# Patient Record
Sex: MALE | Race: WHITE | Employment: FULL TIME | URBAN - METROPOLITAN AREA
[De-identification: names, ages, dates, MRNs, and addresses within clinical notes are randomized per-mention and may not be internally consistent; named-entity substitution may affect disease eponyms.]

---

## 2019-08-23 ENCOUNTER — HOSPITAL ENCOUNTER (OUTPATIENT)
Age: 59
Setting detail: OBSERVATION
Discharge: HOME | End: 2019-08-24
Attending: EMERGENCY MEDICINE | Admitting: INTERNAL MEDICINE
Payer: COMMERCIAL

## 2019-08-23 ENCOUNTER — APPOINTMENT (OUTPATIENT)
Dept: GENERAL RADIOLOGY | Age: 59
End: 2019-08-23
Payer: COMMERCIAL

## 2019-08-23 DIAGNOSIS — R07.9 CHEST PAIN, UNSPECIFIED TYPE: Primary | ICD-10-CM

## 2019-08-23 LAB
ANION GAP SERPL CALCULATED.3IONS-SCNC: 7 MMOL/L (ref 7–16)
BASOPHILS ABSOLUTE: 0.04 E9/L (ref 0–0.2)
BASOPHILS RELATIVE PERCENT: 0.4 % (ref 0–2)
BUN BLDV-MCNC: 17 MG/DL (ref 8–23)
CALCIUM SERPL-MCNC: 9.7 MG/DL (ref 8.6–10.2)
CHLORIDE BLD-SCNC: 107 MMOL/L (ref 98–107)
CK MB: 2.6 NG/ML (ref 0–7.7)
CO2: 27 MMOL/L (ref 22–29)
CREAT SERPL-MCNC: 1 MG/DL (ref 0.7–1.2)
EKG ATRIAL RATE: 110 BPM
EKG ATRIAL RATE: 66 BPM
EKG P AXIS: 52 DEGREES
EKG P AXIS: 54 DEGREES
EKG P-R INTERVAL: 136 MS
EKG Q-T INTERVAL: 386 MS
EKG Q-T INTERVAL: 406 MS
EKG QRS DURATION: 90 MS
EKG QRS DURATION: 92 MS
EKG QTC CALCULATION (BAZETT): 398 MS
EKG QTC CALCULATION (BAZETT): 404 MS
EKG R AXIS: 46 DEGREES
EKG R AXIS: 52 DEGREES
EKG T AXIS: 55 DEGREES
EKG T AXIS: 56 DEGREES
EKG VENTRICULAR RATE: 58 BPM
EKG VENTRICULAR RATE: 66 BPM
EOSINOPHILS ABSOLUTE: 0.37 E9/L (ref 0.05–0.5)
EOSINOPHILS RELATIVE PERCENT: 3.9 % (ref 0–6)
GFR AFRICAN AMERICAN: >60
GFR NON-AFRICAN AMERICAN: >60 ML/MIN/1.73
GLUCOSE BLD-MCNC: 106 MG/DL (ref 74–99)
HCT VFR BLD CALC: 48.7 % (ref 37–54)
HEMOGLOBIN: 16.4 G/DL (ref 12.5–16.5)
IMMATURE GRANULOCYTES #: 0.07 E9/L
IMMATURE GRANULOCYTES %: 0.7 % (ref 0–5)
LYMPHOCYTES ABSOLUTE: 2.59 E9/L (ref 1.5–4)
LYMPHOCYTES RELATIVE PERCENT: 27.2 % (ref 20–42)
MCH RBC QN AUTO: 30.9 PG (ref 26–35)
MCHC RBC AUTO-ENTMCNC: 33.7 % (ref 32–34.5)
MCV RBC AUTO: 91.7 FL (ref 80–99.9)
MONOCYTES ABSOLUTE: 0.74 E9/L (ref 0.1–0.95)
MONOCYTES RELATIVE PERCENT: 7.8 % (ref 2–12)
NEUTROPHILS ABSOLUTE: 5.72 E9/L (ref 1.8–7.3)
NEUTROPHILS RELATIVE PERCENT: 60 % (ref 43–80)
PDW BLD-RTO: 14.3 FL (ref 11.5–15)
PLATELET # BLD: 237 E9/L (ref 130–450)
PMV BLD AUTO: 11.8 FL (ref 7–12)
POTASSIUM SERPL-SCNC: 4.5 MMOL/L (ref 3.5–5)
RBC # BLD: 5.31 E12/L (ref 3.8–5.8)
REASON FOR REJECTION: NORMAL
REJECTED TEST: NORMAL
SODIUM BLD-SCNC: 141 MMOL/L (ref 132–146)
TOTAL CK: 99 U/L (ref 20–200)
TROPONIN: <0.01 NG/ML (ref 0–0.03)
TROPONIN: <0.01 NG/ML (ref 0–0.03)
WBC # BLD: 9.5 E9/L (ref 4.5–11.5)

## 2019-08-23 PROCEDURE — G0378 HOSPITAL OBSERVATION PER HR: HCPCS

## 2019-08-23 PROCEDURE — 2580000003 HC RX 258: Performed by: INTERNAL MEDICINE

## 2019-08-23 PROCEDURE — 6370000000 HC RX 637 (ALT 250 FOR IP): Performed by: PREVENTIVE MEDICINE

## 2019-08-23 PROCEDURE — 96374 THER/PROPH/DIAG INJ IV PUSH: CPT

## 2019-08-23 PROCEDURE — 85025 COMPLETE CBC W/AUTO DIFF WBC: CPT

## 2019-08-23 PROCEDURE — 82550 ASSAY OF CK (CPK): CPT

## 2019-08-23 PROCEDURE — 6370000000 HC RX 637 (ALT 250 FOR IP): Performed by: PHYSICIAN ASSISTANT

## 2019-08-23 PROCEDURE — 93010 ELECTROCARDIOGRAM REPORT: CPT | Performed by: INTERNAL MEDICINE

## 2019-08-23 PROCEDURE — 82553 CREATINE MB FRACTION: CPT

## 2019-08-23 PROCEDURE — 6370000000 HC RX 637 (ALT 250 FOR IP): Performed by: INTERNAL MEDICINE

## 2019-08-23 PROCEDURE — 99285 EMERGENCY DEPT VISIT HI MDM: CPT

## 2019-08-23 PROCEDURE — 93005 ELECTROCARDIOGRAM TRACING: CPT | Performed by: PREVENTIVE MEDICINE

## 2019-08-23 PROCEDURE — 80048 BASIC METABOLIC PNL TOTAL CA: CPT

## 2019-08-23 PROCEDURE — 6370000000 HC RX 637 (ALT 250 FOR IP): Performed by: EMERGENCY MEDICINE

## 2019-08-23 PROCEDURE — 84484 ASSAY OF TROPONIN QUANT: CPT

## 2019-08-23 PROCEDURE — 71045 X-RAY EXAM CHEST 1 VIEW: CPT

## 2019-08-23 PROCEDURE — 36415 COLL VENOUS BLD VENIPUNCTURE: CPT

## 2019-08-23 PROCEDURE — 99244 OFF/OP CNSLTJ NEW/EST MOD 40: CPT | Performed by: INTERNAL MEDICINE

## 2019-08-23 PROCEDURE — 94761 N-INVAS EAR/PLS OXIMETRY MLT: CPT

## 2019-08-23 PROCEDURE — 6360000002 HC RX W HCPCS: Performed by: EMERGENCY MEDICINE

## 2019-08-23 RX ORDER — ASPIRIN 81 MG/1
324 TABLET, CHEWABLE ORAL ONCE
Status: COMPLETED | OUTPATIENT
Start: 2019-08-23 | End: 2019-08-23

## 2019-08-23 RX ORDER — ASCORBIC ACID 500 MG
500 TABLET ORAL DAILY
COMMUNITY

## 2019-08-23 RX ORDER — PANTOPRAZOLE SODIUM 40 MG/1
40 TABLET, DELAYED RELEASE ORAL DAILY
Status: DISCONTINUED | OUTPATIENT
Start: 2019-08-23 | End: 2019-08-24 | Stop reason: HOSPADM

## 2019-08-23 RX ORDER — UBIDECARENONE 50 MG
50 CAPSULE ORAL DAILY
COMMUNITY

## 2019-08-23 RX ORDER — PANTOPRAZOLE SODIUM 40 MG/1
40 TABLET, DELAYED RELEASE ORAL DAILY
Status: ON HOLD | COMMUNITY
End: 2019-08-24 | Stop reason: HOSPADM

## 2019-08-23 RX ORDER — POTASSIUM CHLORIDE 7.45 MG/ML
10 INJECTION INTRAVENOUS PRN
Status: DISCONTINUED | OUTPATIENT
Start: 2019-08-23 | End: 2019-08-24 | Stop reason: HOSPADM

## 2019-08-23 RX ORDER — ONDANSETRON 2 MG/ML
4 INJECTION INTRAMUSCULAR; INTRAVENOUS EVERY 6 HOURS PRN
Status: DISCONTINUED | OUTPATIENT
Start: 2019-08-23 | End: 2019-08-24 | Stop reason: HOSPADM

## 2019-08-23 RX ORDER — KETOROLAC TROMETHAMINE 30 MG/ML
30 INJECTION, SOLUTION INTRAMUSCULAR; INTRAVENOUS ONCE
Status: COMPLETED | OUTPATIENT
Start: 2019-08-23 | End: 2019-08-23

## 2019-08-23 RX ORDER — KETOROLAC TROMETHAMINE 30 MG/ML
15 INJECTION, SOLUTION INTRAMUSCULAR; INTRAVENOUS EVERY 6 HOURS PRN
Status: DISCONTINUED | OUTPATIENT
Start: 2019-08-23 | End: 2019-08-24 | Stop reason: HOSPADM

## 2019-08-23 RX ORDER — SODIUM CHLORIDE 0.9 % (FLUSH) 0.9 %
10 SYRINGE (ML) INJECTION PRN
Status: DISCONTINUED | OUTPATIENT
Start: 2019-08-23 | End: 2019-08-23 | Stop reason: DRUGHIGH

## 2019-08-23 RX ORDER — ASPIRIN 81 MG/1
81 TABLET, CHEWABLE ORAL DAILY
Status: DISCONTINUED | OUTPATIENT
Start: 2019-08-23 | End: 2019-08-23 | Stop reason: DRUGHIGH

## 2019-08-23 RX ORDER — M-VIT,TX,IRON,MINS/CALC/FOLIC 27MG-0.4MG
1 TABLET ORAL DAILY
Status: DISCONTINUED | OUTPATIENT
Start: 2019-08-23 | End: 2019-08-24 | Stop reason: HOSPADM

## 2019-08-23 RX ORDER — SODIUM CHLORIDE 0.9 % (FLUSH) 0.9 %
10 SYRINGE (ML) INJECTION PRN
Status: DISCONTINUED | OUTPATIENT
Start: 2019-08-23 | End: 2019-08-24 | Stop reason: HOSPADM

## 2019-08-23 RX ORDER — ROSUVASTATIN CALCIUM 20 MG/1
20 TABLET, COATED ORAL DAILY
Status: DISCONTINUED | OUTPATIENT
Start: 2019-08-23 | End: 2019-08-24 | Stop reason: HOSPADM

## 2019-08-23 RX ORDER — SODIUM CHLORIDE 0.9 % (FLUSH) 0.9 %
10 SYRINGE (ML) INJECTION EVERY 12 HOURS SCHEDULED
Status: DISCONTINUED | OUTPATIENT
Start: 2019-08-23 | End: 2019-08-24 | Stop reason: HOSPADM

## 2019-08-23 RX ORDER — POTASSIUM CHLORIDE 20 MEQ/1
40 TABLET, EXTENDED RELEASE ORAL PRN
Status: DISCONTINUED | OUTPATIENT
Start: 2019-08-23 | End: 2019-08-24 | Stop reason: HOSPADM

## 2019-08-23 RX ORDER — NITROGLYCERIN 0.4 MG/1
0.4 TABLET SUBLINGUAL EVERY 5 MIN PRN
Status: COMPLETED | OUTPATIENT
Start: 2019-08-23 | End: 2019-08-23

## 2019-08-23 RX ORDER — ACETAMINOPHEN 325 MG/1
650 TABLET ORAL EVERY 4 HOURS PRN
Status: DISCONTINUED | OUTPATIENT
Start: 2019-08-23 | End: 2019-08-24 | Stop reason: HOSPADM

## 2019-08-23 RX ORDER — HYDROCODONE BITARTRATE AND ACETAMINOPHEN 5; 325 MG/1; MG/1
1 TABLET ORAL EVERY 6 HOURS PRN
Status: COMPLETED | OUTPATIENT
Start: 2019-08-23 | End: 2019-08-24

## 2019-08-23 RX ORDER — ASPIRIN 81 MG/1
81 TABLET ORAL DAILY
Status: DISCONTINUED | OUTPATIENT
Start: 2019-08-23 | End: 2019-08-24 | Stop reason: HOSPADM

## 2019-08-23 RX ADMIN — HYDROCODONE BITARTRATE AND ACETAMINOPHEN 1 TABLET: 5; 325 TABLET ORAL at 21:14

## 2019-08-23 RX ADMIN — ASPIRIN 81 MG 324 MG: 81 TABLET ORAL at 09:24

## 2019-08-23 RX ADMIN — ACETAMINOPHEN 650 MG: 325 TABLET, FILM COATED ORAL at 15:57

## 2019-08-23 RX ADMIN — LIDOCAINE HYDROCHLORIDE: 20 SOLUTION ORAL; TOPICAL at 15:57

## 2019-08-23 RX ADMIN — KETOROLAC TROMETHAMINE 30 MG: 30 INJECTION, SOLUTION INTRAMUSCULAR; INTRAVENOUS at 12:16

## 2019-08-23 RX ADMIN — NITROGLYCERIN 0.4 MG: 0.4 TABLET, ORALLY DISINTEGRATING SUBLINGUAL at 09:52

## 2019-08-23 RX ADMIN — Medication 10 ML: at 20:19

## 2019-08-23 RX ADMIN — NITROGLYCERIN 0.4 MG: 0.4 TABLET, ORALLY DISINTEGRATING SUBLINGUAL at 09:33

## 2019-08-23 RX ADMIN — NITROGLYCERIN 0.4 MG: 0.4 TABLET, ORALLY DISINTEGRATING SUBLINGUAL at 09:41

## 2019-08-23 ASSESSMENT — PAIN DESCRIPTION - DESCRIPTORS
DESCRIPTORS: SHARP;STABBING
DESCRIPTORS: SHARP
DESCRIPTORS: CONSTANT;SHARP
DESCRIPTORS: SHARP;STABBING

## 2019-08-23 ASSESSMENT — PAIN SCALES - GENERAL
PAINLEVEL_OUTOF10: 8
PAINLEVEL_OUTOF10: 8
PAINLEVEL_OUTOF10: 3
PAINLEVEL_OUTOF10: 8
PAINLEVEL_OUTOF10: 4
PAINLEVEL_OUTOF10: 8
PAINLEVEL_OUTOF10: 9
PAINLEVEL_OUTOF10: 0

## 2019-08-23 ASSESSMENT — PAIN DESCRIPTION - ONSET
ONSET: ON-GOING
ONSET: ON-GOING

## 2019-08-23 ASSESSMENT — PAIN DESCRIPTION - PROGRESSION
CLINICAL_PROGRESSION: GRADUALLY WORSENING
CLINICAL_PROGRESSION: NOT CHANGED

## 2019-08-23 ASSESSMENT — PAIN DESCRIPTION - LOCATION
LOCATION: CHEST

## 2019-08-23 ASSESSMENT — PAIN DESCRIPTION - FREQUENCY
FREQUENCY: CONTINUOUS

## 2019-08-23 ASSESSMENT — PAIN DESCRIPTION - ORIENTATION
ORIENTATION: LEFT

## 2019-08-23 ASSESSMENT — PAIN DESCRIPTION - PAIN TYPE
TYPE: ACUTE PAIN
TYPE: ACUTE PAIN

## 2019-08-23 NOTE — ED PROVIDER NOTES
--------------------------------- IMPRESSION AND DISPOSITION ---------------------------------    IMPRESSION  1. Chest pain, unspecified type        DISPOSITION  Disposition: Admit to telemetry  Patient condition is stable    SCRIBE ATTESTATION  8/23/19, 9:53 AM.    This note is prepared by Khadra Jay acting as Scribe for Garey Peabody, DO and Belen Martinez DO. Garey Peabody, DO:  The scribe's documentation has been prepared under my direction and personally reviewed by me in its entirety. I confirm that the note above accurately reflects all work, treatment, procedures, and medical decision making performed by me.              Rigo Cleveland DO  Resident  08/23/19 9776

## 2019-08-23 NOTE — CONSULTS
Consult dictated  NON cardiac symptoms  No tests planned  Given his history of GERD maybe he has esophagitis  Will sign off
venous distention. No carotid bruits. RESPIRATORY:  The lungs are clear. There are no rales or wheeze. CARDIOVASCULAR:  The apical impulse is nondisplaced. Normal S1, single  S2. No obvious murmur, gallop, rub, or click detected. ABDOMEN:  Soft and nontender to palpation. Bowel sounds present. No  palpable masses. MUSCULOSKELETAL:  Good muscle strength and tone. No atrophy or abnormal  movement. SKIN:  Warm and dry. No stasis dermatitis or ulcers. NEUROLOGIC/PSYCHIATRIC:  Oriented to person, place, and time. Speech  clear and appropriate. LABORATORY DATA:  GFR greater than 60. Troponin negative. Hemoglobin  16.4. Chest x-ray unremarkable. EKG, in normal sinus rhythm. IMPRESSION/PLAN:  The patient presents with chest discomfort that is not  cardiac. Merely by clinical history, one can determine that this is not  ischemic pain. Also, this is not pericarditis as there is no rub,  characteristic pain, EKG changes. This is also not a pulmonary  embolism. Perhaps, he has gastroesophageal disease and severe  gastritis. I will recommend GI evaluation. No cardiac tests are  planned, we will sign off.         Mary Beckett MD    D: 08/23/2019 14:31:33       T: 08/23/2019 15:34:51     [email or web address]  Job#: [id number]     Doc#: [id number]    CC:

## 2019-08-24 VITALS
HEART RATE: 68 BPM | HEIGHT: 76 IN | OXYGEN SATURATION: 96 % | TEMPERATURE: 98.2 F | SYSTOLIC BLOOD PRESSURE: 139 MMHG | RESPIRATION RATE: 18 BRPM | DIASTOLIC BLOOD PRESSURE: 102 MMHG | WEIGHT: 186 LBS | BODY MASS INDEX: 22.65 KG/M2

## 2019-08-24 LAB
ANION GAP SERPL CALCULATED.3IONS-SCNC: 11 MMOL/L (ref 7–16)
BASOPHILS ABSOLUTE: 0.04 E9/L (ref 0–0.2)
BASOPHILS RELATIVE PERCENT: 0.5 % (ref 0–2)
BUN BLDV-MCNC: 17 MG/DL (ref 8–23)
CALCIUM SERPL-MCNC: 9.2 MG/DL (ref 8.6–10.2)
CHLORIDE BLD-SCNC: 103 MMOL/L (ref 98–107)
CO2: 30 MMOL/L (ref 22–29)
CREAT SERPL-MCNC: 1.3 MG/DL (ref 0.7–1.2)
EOSINOPHILS ABSOLUTE: 0.39 E9/L (ref 0.05–0.5)
EOSINOPHILS RELATIVE PERCENT: 4.5 % (ref 0–6)
GFR AFRICAN AMERICAN: >60
GFR NON-AFRICAN AMERICAN: 56 ML/MIN/1.73
GLUCOSE BLD-MCNC: 101 MG/DL (ref 74–99)
HCT VFR BLD CALC: 46.9 % (ref 37–54)
HEMOGLOBIN: 15.6 G/DL (ref 12.5–16.5)
IMMATURE GRANULOCYTES #: 0.06 E9/L
IMMATURE GRANULOCYTES %: 0.7 % (ref 0–5)
LYMPHOCYTES ABSOLUTE: 3.27 E9/L (ref 1.5–4)
LYMPHOCYTES RELATIVE PERCENT: 37.8 % (ref 20–42)
MCH RBC QN AUTO: 30.8 PG (ref 26–35)
MCHC RBC AUTO-ENTMCNC: 33.3 % (ref 32–34.5)
MCV RBC AUTO: 92.7 FL (ref 80–99.9)
MONOCYTES ABSOLUTE: 0.72 E9/L (ref 0.1–0.95)
MONOCYTES RELATIVE PERCENT: 8.3 % (ref 2–12)
NEUTROPHILS ABSOLUTE: 4.18 E9/L (ref 1.8–7.3)
NEUTROPHILS RELATIVE PERCENT: 48.2 % (ref 43–80)
PDW BLD-RTO: 14.3 FL (ref 11.5–15)
PLATELET # BLD: 167 E9/L (ref 130–450)
PMV BLD AUTO: 11.8 FL (ref 7–12)
POTASSIUM REFLEX MAGNESIUM: 4.3 MMOL/L (ref 3.5–5)
RBC # BLD: 5.06 E12/L (ref 3.8–5.8)
SODIUM BLD-SCNC: 144 MMOL/L (ref 132–146)
WBC # BLD: 8.7 E9/L (ref 4.5–11.5)

## 2019-08-24 PROCEDURE — 6370000000 HC RX 637 (ALT 250 FOR IP): Performed by: INTERNAL MEDICINE

## 2019-08-24 PROCEDURE — 2580000003 HC RX 258: Performed by: INTERNAL MEDICINE

## 2019-08-24 PROCEDURE — 6370000000 HC RX 637 (ALT 250 FOR IP): Performed by: PHYSICIAN ASSISTANT

## 2019-08-24 PROCEDURE — 36415 COLL VENOUS BLD VENIPUNCTURE: CPT

## 2019-08-24 PROCEDURE — G0378 HOSPITAL OBSERVATION PER HR: HCPCS

## 2019-08-24 PROCEDURE — 80048 BASIC METABOLIC PNL TOTAL CA: CPT

## 2019-08-24 PROCEDURE — 85025 COMPLETE CBC W/AUTO DIFF WBC: CPT

## 2019-08-24 RX ADMIN — Medication 10 ML: at 09:15

## 2019-08-24 RX ADMIN — HYDROCODONE BITARTRATE AND ACETAMINOPHEN 1 TABLET: 5; 325 TABLET ORAL at 06:11

## 2019-08-24 RX ADMIN — MULTIPLE VITAMINS W/ MINERALS TAB 1 TABLET: TAB at 09:14

## 2019-08-24 RX ADMIN — PANTOPRAZOLE SODIUM 40 MG: 40 TABLET, DELAYED RELEASE ORAL at 09:15

## 2019-08-24 RX ADMIN — ASPIRIN 81 MG: 81 TABLET, COATED ORAL at 09:13

## 2019-08-24 RX ADMIN — ROSUVASTATIN CALCIUM 20 MG: 20 TABLET, FILM COATED ORAL at 09:14

## 2019-08-24 ASSESSMENT — PAIN DESCRIPTION - DESCRIPTORS: DESCRIPTORS: ACHING;SHARP;STABBING

## 2019-08-24 ASSESSMENT — PAIN DESCRIPTION - ONSET: ONSET: ON-GOING

## 2019-08-24 ASSESSMENT — PAIN DESCRIPTION - ORIENTATION: ORIENTATION: LEFT

## 2019-08-24 ASSESSMENT — PAIN SCALES - GENERAL
PAINLEVEL_OUTOF10: 8
PAINLEVEL_OUTOF10: 8

## 2019-08-24 ASSESSMENT — PAIN DESCRIPTION - PAIN TYPE: TYPE: ACUTE PAIN

## 2019-08-24 ASSESSMENT — PAIN DESCRIPTION - PROGRESSION
CLINICAL_PROGRESSION: GRADUALLY WORSENING
CLINICAL_PROGRESSION: GRADUALLY WORSENING

## 2019-08-24 ASSESSMENT — PAIN DESCRIPTION - FREQUENCY: FREQUENCY: CONTINUOUS

## 2019-08-24 ASSESSMENT — PAIN DESCRIPTION - LOCATION: LOCATION: CHEST

## 2019-08-24 NOTE — H&P
reports that he has been smoking cigarettes. He has been smoking about 1.00 pack per day. He has never used smokeless tobacco.  ETOH:   reports that he drinks about 6.0 standard drinks of alcohol per week. MARITAL STATUS:    OCCUPATION:      Family History:   History reviewed. No pertinent family history. REVIEW OF SYSTEMS:    General ROS:left cp   Hematological and Lymphatic ROS: negative  Endocrine ROS: negative  Respiratory ROS: no cough, shortness of breath, or wheezing  Cardiovascular ROS: no chest pain or dyspnea on exertion  Gastrointestinal ROS: no abdominal pain, change in bowel habits, or black or bloody stools  Genito-Urinary ROS: no dysuria, trouble voiding, or hematuria  Neurological ROS: no TIA or stroke symptoms  negative    Vitals:  BP (!) 139/102   Pulse 68   Temp 98.2 °F (36.8 °C) (Oral)   Resp 18   Ht 6' 4\" (1.93 m)   Wt 186 lb (84.4 kg)   SpO2 96%   BMI 22.64 kg/m²     PHYSICAL EXAM:  General:  Awake, alert, oriented X 3. Well developed, well nourished, well groomed. No apparent distress. HEENT:  Normocephalic, atraumatic. Pupils equal, round, reactive to light. No scleral icterus. No conjunctival injection. Normal lips, teeth, and gums. No nasal discharge. Neck:  Supple  Heart:  RRR, no murmurs, gallops, rubs, carotid upstroke normal, no carotid bruits  Lungs:  CTA bilaterally, bilat symmetrical expansion, no wheeze, rales, or rhonchi  Abdomen:   Bowel sounds present, soft, nontender, no masses, no organomegaly, no peritoneal signs  Extremities:  No clubbing, cyanosis, or edema  Skin:  Warm and dry, no open lesions or rash  Neuro:  Cranial nerves 2-12 intact, no focal deficits  Breast: deferred  Rectal: deferred  Genitalia:  deferred      DATA:     Recent Labs     08/23/19  0905 08/24/19  0559   WBC 9.5 8.7   HGB 16.4 15.6    167     Recent Labs     08/23/19  1002 08/24/19  0559    144   K 4.5 4.3   BUN 17 17   CREATININE 1.0 1.3*     No results for input(s):

## 2019-08-26 ENCOUNTER — TELEPHONE (OUTPATIENT)
Dept: CARDIOLOGY CLINIC | Age: 59
End: 2019-08-26

## 2021-05-12 ENCOUNTER — TELEPHONE (OUTPATIENT)
Dept: CARDIOLOGY | Age: 61
End: 2021-05-12

## 2021-05-12 ENCOUNTER — OFFICE VISIT (OUTPATIENT)
Dept: CARDIOLOGY CLINIC | Age: 61
End: 2021-05-12
Payer: COMMERCIAL

## 2021-05-12 VITALS
HEART RATE: 57 BPM | BODY MASS INDEX: 25.81 KG/M2 | SYSTOLIC BLOOD PRESSURE: 118 MMHG | DIASTOLIC BLOOD PRESSURE: 80 MMHG | WEIGHT: 190.6 LBS | OXYGEN SATURATION: 94 % | RESPIRATION RATE: 14 BRPM | HEIGHT: 72 IN

## 2021-05-12 DIAGNOSIS — R00.1 BRADYCARDIA: ICD-10-CM

## 2021-05-12 DIAGNOSIS — R07.2 PRECORDIAL PAIN: Primary | ICD-10-CM

## 2021-05-12 DIAGNOSIS — R06.09 DYSPNEA ON EXERTION: ICD-10-CM

## 2021-05-12 DIAGNOSIS — R42 DIZZINESS: ICD-10-CM

## 2021-05-12 PROCEDURE — 99244 OFF/OP CNSLTJ NEW/EST MOD 40: CPT | Performed by: INTERNAL MEDICINE

## 2021-05-12 PROCEDURE — 93000 ELECTROCARDIOGRAM COMPLETE: CPT | Performed by: INTERNAL MEDICINE

## 2021-05-12 NOTE — PROGRESS NOTES
Patient Active Problem List   Diagnosis    Chest pain       Current Outpatient Medications   Medication Sig Dispense Refill    Coenzyme Q10 (COQ10) 50 MG CAPS Take 50 mg by mouth daily      vitamin C (ASCORBIC ACID) 500 MG tablet Take 500 mg by mouth daily      fish oil-omega-3 fatty acids 1000 MG capsule Take 2 g by mouth daily.  rosuvastatin (CRESTOR) 10 MG tablet Take 20 mg by mouth daily.  aspirin 81 MG EC tablet Take 81 mg by mouth daily.  therapeutic multivitamin-minerals (THERAGRAN-M) tablet Take 1 tablet by mouth daily. No current facility-administered medications for this visit. CC:    Patient is seen in Initial Evaluation for:  1. Bradycardia    2. Dizziness    3. Precordial pain    4. Dyspnea on exertion        HPI:  Patient is seen in evaluation for 3 new issues:  Relates episode of chest discomfort after working out. This was without radiation. He had no associated diaphoresis nausea or vomiting. Additionally, he relates he has had shortness of breath with exertion since that time. Also, he has noted some light headedness. Lately has noted to be mildly bradycardia.       ROS:   General: No unusual weight gain, no change in exercise tolerance  Skin: No rash or itching  EENT: No vision changes or nosebleeds  Cardiovascular: No orthopnea or paroxysmal nocturnal dyspnea  Respiratory: No cough or hemoptysis  Gastrointestinal: No hematemesis or recent changes in bowel habits  Genitourinary: No hematuria, urgency or frequency  Musculoskeletal: No muscular weakness or joint swelling   Neurologic / Psychiatric: No incoordination or convulsions  Allergic / Immunologic/ Lymphatic / Endocrine: No anemia or bleeding tendency    Social History     Socioeconomic History    Marital status:      Spouse name: Not on file    Number of children: Not on file    Years of education: Not on file    Highest education level: Not on file   Occupational History    Not on file Social Needs    Financial resource strain: Not on file    Food insecurity     Worry: Not on file     Inability: Not on file    Transportation needs     Medical: Not on file     Non-medical: Not on file   Tobacco Use    Smoking status: Current Every Day Smoker     Packs/day: 1.00     Types: Cigarettes    Smokeless tobacco: Never Used   Substance and Sexual Activity    Alcohol use: Yes     Alcohol/week: 6.0 standard drinks     Types: 6 Cans of beer per week    Drug use: No    Sexual activity: Not on file   Lifestyle    Physical activity     Days per week: Not on file     Minutes per session: Not on file    Stress: Not on file   Relationships    Social connections     Talks on phone: Not on file     Gets together: Not on file     Attends Congregation service: Not on file     Active member of club or organization: Not on file     Attends meetings of clubs or organizations: Not on file     Relationship status: Not on file    Intimate partner violence     Fear of current or ex partner: Not on file     Emotionally abused: Not on file     Physically abused: Not on file     Forced sexual activity: Not on file   Other Topics Concern    Not on file   Social History Narrative    Not on file       Family History   Problem Relation Age of Onset    Heart Attack Mother     Heart Attack Father    Father with heart attack at age 68. Mother age 46    Past Medical History:   Diagnosis Date    GERD (gastroesophageal reflux disease)     Hyperlipidemia        PHYSICAL EXAM:  CONSTITUTIONAL:  Well developed, well nourished    Vitals:    05/12/21 0715 05/12/21 0727   BP: (!) 144/88 118/80   Site: Right Upper Arm Left Upper Arm   Pulse: 57    Resp: 14    SpO2: 94%    Weight: 190 lb 9.6 oz (86.5 kg)    Height: 6' (1.829 m)      HEAD & FACE: Normocephalic. Symmetric. EYES: No xanthelasma. Conjunctivae not injected. EARS, NOSE, MOUTH & THROAT: Good dentition. No oral pallor or cyanosis. NECK: No JVD at 30 degrees.   No thyromegaly. RESPIRATORY: Clear to auscultation and percussion in all fields. No use of accessory muscle or intercostal retractions. CARDIOVASCULAR: Regular rate and rhythm. No lifts or thrills on palpitation. Auscultation with normal S1-S2 in intensity and splitting. No carotid bruits. Abdominal aorta not enlarged. Femoral arteries without bruits. Pedal pulses 2+. No edema. ABDOMEN: Soft without hepatic or splenic enlargement. No tenderness. MUSCULOSKELETAL: No kyphosis or scoliosis of the back. Good muscle strength and tone. No muscle atrophy. Normal gait and ability to undergo exercise stress testing. EXTREMITIES: No clubbing or cyanosis. SKIN: No Xanthomas or ulcerations. NEUROLOGIC: Oriented to time, place and person. Normal mood and affect. LYMPHATIC:  No palpable neck or supraclavicular nodes. No splenomegaly. EKG: the EKG tracing was reviewed and found to reveal: Normal sinus rhythm. Diffuse ST-T wave changes. ASSESSMENT:                                                     ORDERS:       Diagnosis Orders   1. Precordial pain  NM Cardiac Stress Test Nuclear Imaging    Cardiac Stress Test - w/Pharm   2. Bradycardia  EKG 12 lead   3. Dizziness     4. Dyspnea on exertion     Will require further evaluation with stress testing. Nuclear imaging required due to resting EKG changes. PLAN:   See above orders. Medication reconciliation completed. Old records were reviewed and found to reveal:   Discussed issues that would prompt earlier evaluation. Same cardiac medications. Follow-up office visit - pending above evaluation.

## 2021-05-13 ENCOUNTER — HOSPITAL ENCOUNTER (OUTPATIENT)
Dept: CARDIOLOGY | Age: 61
Discharge: HOME | End: 2021-05-13
Payer: COMMERCIAL

## 2021-05-13 VITALS
WEIGHT: 190 LBS | HEIGHT: 72 IN | SYSTOLIC BLOOD PRESSURE: 122 MMHG | BODY MASS INDEX: 25.73 KG/M2 | DIASTOLIC BLOOD PRESSURE: 68 MMHG

## 2021-05-13 DIAGNOSIS — R07.9 CHEST PAIN, UNSPECIFIED TYPE: Primary | ICD-10-CM

## 2021-05-13 PROCEDURE — 3430000000 HC RX DIAGNOSTIC RADIOPHARMACEUTICAL: Performed by: INTERNAL MEDICINE

## 2021-05-13 PROCEDURE — 2580000003 HC RX 258: Performed by: INTERNAL MEDICINE

## 2021-05-13 PROCEDURE — 6360000002 HC RX W HCPCS: Performed by: INTERNAL MEDICINE

## 2021-05-13 PROCEDURE — 78452 HT MUSCLE IMAGE SPECT MULT: CPT

## 2021-05-13 PROCEDURE — 93017 CV STRESS TEST TRACING ONLY: CPT

## 2021-05-13 PROCEDURE — A9500 TC99M SESTAMIBI: HCPCS | Performed by: INTERNAL MEDICINE

## 2021-05-13 RX ORDER — PANTOPRAZOLE SODIUM 40 MG/1
40 GRANULE, DELAYED RELEASE ORAL
COMMUNITY

## 2021-05-13 RX ORDER — SODIUM CHLORIDE 9 MG/ML
25 INJECTION, SOLUTION INTRAVENOUS PRN
Status: DISCONTINUED | OUTPATIENT
Start: 2021-05-13 | End: 2021-05-14 | Stop reason: HOSPADM

## 2021-05-13 RX ORDER — SODIUM CHLORIDE 0.9 % (FLUSH) 0.9 %
10 SYRINGE (ML) INJECTION PRN
Status: DISCONTINUED | OUTPATIENT
Start: 2021-05-13 | End: 2021-05-14 | Stop reason: HOSPADM

## 2021-05-13 RX ADMIN — SODIUM CHLORIDE, PRESERVATIVE FREE 10 ML: 5 INJECTION INTRAVENOUS at 09:49

## 2021-05-13 RX ADMIN — SODIUM CHLORIDE, PRESERVATIVE FREE 10 ML: 5 INJECTION INTRAVENOUS at 07:45

## 2021-05-13 RX ADMIN — Medication 30 MILLICURIE: at 09:48

## 2021-05-13 RX ADMIN — REGADENOSON 0.4 MG: 0.08 INJECTION, SOLUTION INTRAVENOUS at 09:48

## 2021-05-13 RX ADMIN — Medication 9.9 MILLICURIE: at 07:45

## 2021-05-13 RX ADMIN — SODIUM CHLORIDE, PRESERVATIVE FREE 10 ML: 5 INJECTION INTRAVENOUS at 09:48

## 2021-05-13 SDOH — HEALTH STABILITY: MENTAL HEALTH: HOW MANY STANDARD DRINKS CONTAINING ALCOHOL DO YOU HAVE ON A TYPICAL DAY?: NOT ASKED

## 2021-05-13 SDOH — HEALTH STABILITY: MENTAL HEALTH: HOW OFTEN DO YOU HAVE A DRINK CONTAINING ALCOHOL?: NOT ASKED

## 2021-05-13 NOTE — PROCEDURES
[address] [facility] [address]  [phone]                Pharmacologic Stress Nuclear Gated SPECT Study    Name: Caleb Spaulding Account Number: [de-identified]    :  1960          Sex: male         Date of Study:  2021    Height: 6' (182.9 cm)         Weight: 190 lb (86.2 kg)     Ordering Provider: Essence Sharma. Iris Brian MD          PCP: Todd Bryant DO      Cardiologist: Essence Sharma. Iris Brian MD             Interpreting Physician: Essence Sharma. Abeba Lucero  _________________________________________________________________________________    Indication:   Detecting the presence and location of coronary artery disease    Clinical History:   Patient has no known history of coronary artery disease. Resting ECG:    AR int .18 sec, QRS int . 10 sec, QT int . 40 sec; HR 57 bpm  Normal sinus rhythm and Nonspecific ST-T wave changes    Procedure:   Pharmacologic stress testing was performed with regadenoson 0.4 mg for 15 seconds. The heart rate was 57 at baseline and judi to 103 beats during the infusion. This corresponds to 65% of maximum predicted heart rate. The blood pressure at baseline was 122/68 and blood pressure at the end of infusion was 130/68. Blood pressure response was normal during the stress procedure. The patient experienced mild increased breathing headache, \"felt weird extremity weakness. Post drinking Regular Coke and coffee symptoms began to lessened, then resolved. ECG during the infusion did not change. IMAGING: Myocardial perfusion imaging was performed at rest 30-35 minutes following the intravenous injection of 9.9 mCi of (Tc-Sestamibi) followed by 10 ml of Normal Saline. As per infusion protocol, the patient was injected intravenously with 30.0 mCi of (Tc-Sestamibi) followed by 10 ml of Normal Saline. Gated post-stress tomographic imaging was performed 20-25 minutes after stress.      FINDINGS: The overall quality of the study was fair. Left ventricular cavity size was noted to be normal.    Rotational analog analysis demonstrated no patient motion or abnormal extracardiac radioactivity and soft tissue diaphragmatic attenuation. The gated SPECT stress imaging in the short, vertical long, and horizontal long axis demonstrated normal homogeneous tracer distribution throughout the myocardium. The resting images show no change. Gated SPECT left ventricular ejection fraction was calculated to be 53%, with normal myocardial thickening and wall motion. Impression:    1. ECG during the infusion did not change. 2. The myocardial perfusion imaging was normal with attenuation artifact. 3. Overall left ventricular systolic function was normal without regional wall motion abnormalities. 4. Low risk general pharmacologic stress test.    Thank you for sending your patient to this [facility].      Electronically signed by Codie Villatoro MD on 5/13/21 at 2:08 PM EDT

## 2021-05-14 ENCOUNTER — TELEPHONE (OUTPATIENT)
Dept: CARDIOLOGY CLINIC | Age: 61
End: 2021-05-14

## 2021-05-14 DIAGNOSIS — R00.1 BRADYCARDIA: Primary | ICD-10-CM

## 2021-05-14 NOTE — TELEPHONE ENCOUNTER
patient notified and instructed on stress test results and monitor.   He was scheduled for monitor placement

## 2021-05-18 ENCOUNTER — NURSE ONLY (OUTPATIENT)
Dept: CARDIOLOGY CLINIC | Age: 61
End: 2021-05-18

## 2021-05-18 NOTE — PROGRESS NOTES
Patient was in today for 24 hour Holter monitor per Dr. Robina Peace. Patient was given instructions and questions were answered, patient verbalized understanding.   Serial # [id number]    BETTY Quesada

## 2021-05-20 DIAGNOSIS — R00.1 BRADYCARDIA: ICD-10-CM

## 2021-05-24 ENCOUNTER — TELEPHONE (OUTPATIENT)
Dept: CARDIOLOGY CLINIC | Age: 61
End: 2021-05-24

## 2021-05-24 DIAGNOSIS — R07.2 PRECORDIAL PAIN: Primary | ICD-10-CM

## 2021-05-24 NOTE — TELEPHONE ENCOUNTER
Please set up for CT scan of chest with contrast at [facility], [facility]. Did advise patient if he should have any increase in the severity of his chest discomfort that he should present to the ER.

## 2021-05-24 NOTE — TELEPHONE ENCOUNTER
Patient called to let you know the chest pain has not let up. He stated this has been persistent. Please advise on what is next.

## 2021-06-10 DIAGNOSIS — R07.9 CHEST PAIN, UNSPECIFIED TYPE: Primary | ICD-10-CM

## 2021-06-11 ENCOUNTER — HOSPITAL ENCOUNTER (OUTPATIENT)
Age: 61
Discharge: HOME | End: 2021-06-11
Payer: COMMERCIAL

## 2021-06-11 DIAGNOSIS — R07.9 CHEST PAIN, UNSPECIFIED TYPE: ICD-10-CM

## 2021-06-11 LAB
ANION GAP SERPL CALCULATED.3IONS-SCNC: 8 MMOL/L (ref 7–16)
BUN BLDV-MCNC: 17 MG/DL (ref 6–23)
CALCIUM SERPL-MCNC: 9.8 MG/DL (ref 8.6–10.2)
CHLORIDE BLD-SCNC: 102 MMOL/L (ref 98–107)
CO2: 30 MMOL/L (ref 22–29)
CREAT SERPL-MCNC: 1.2 MG/DL (ref 0.7–1.2)
GFR AFRICAN AMERICAN: >60
GFR NON-AFRICAN AMERICAN: >60 ML/MIN/1.73
GLUCOSE BLD-MCNC: 91 MG/DL (ref 74–99)
POTASSIUM SERPL-SCNC: 4.8 MMOL/L (ref 3.5–5)
SODIUM BLD-SCNC: 140 MMOL/L (ref 132–146)

## 2021-06-11 PROCEDURE — 80048 BASIC METABOLIC PNL TOTAL CA: CPT

## 2021-06-11 PROCEDURE — 36415 COLL VENOUS BLD VENIPUNCTURE: CPT

## 2021-06-24 ENCOUNTER — HOSPITAL ENCOUNTER (OUTPATIENT)
Dept: CT IMAGING | Age: 61
Discharge: HOME | End: 2021-06-26
Payer: COMMERCIAL

## 2021-06-24 VITALS
RESPIRATION RATE: 12 BRPM | WEIGHT: 185 LBS | HEIGHT: 72 IN | BODY MASS INDEX: 25.06 KG/M2 | DIASTOLIC BLOOD PRESSURE: 76 MMHG | HEART RATE: 51 BPM | OXYGEN SATURATION: 100 % | SYSTOLIC BLOOD PRESSURE: 151 MMHG

## 2021-06-24 DIAGNOSIS — R07.2 PRECORDIAL PAIN: ICD-10-CM

## 2021-06-24 PROCEDURE — 2580000003 HC RX 258: Performed by: INTERNAL MEDICINE

## 2021-06-24 PROCEDURE — 75574 CT ANGIO HRT W/3D IMAGE: CPT

## 2021-06-24 PROCEDURE — 6360000004 HC RX CONTRAST MEDICATION: Performed by: RADIOLOGY

## 2021-06-24 PROCEDURE — 75574 CT ANGIO HRT W/3D IMAGE: CPT | Performed by: INTERNAL MEDICINE

## 2021-06-24 PROCEDURE — 6370000000 HC RX 637 (ALT 250 FOR IP): Performed by: INTERNAL MEDICINE

## 2021-06-24 RX ORDER — NITROGLYCERIN 0.4 MG/1
0.8 TABLET SUBLINGUAL ONCE
Status: COMPLETED | OUTPATIENT
Start: 2021-06-24 | End: 2021-06-24

## 2021-06-24 RX ORDER — 0.9 % SODIUM CHLORIDE 0.9 %
1000 INTRAVENOUS SOLUTION INTRAVENOUS ONCE
Status: COMPLETED | OUTPATIENT
Start: 2021-06-24 | End: 2021-06-24

## 2021-06-24 RX ADMIN — NITROGLYCERIN 0.8 MG: 0.4 TABLET SUBLINGUAL at 10:50

## 2021-06-24 RX ADMIN — IOPAMIDOL 81 ML: 755 INJECTION, SOLUTION INTRAVENOUS at 10:55

## 2021-06-24 RX ADMIN — SODIUM CHLORIDE 1000 ML: 9 INJECTION, SOLUTION INTRAVENOUS at 09:28

## 2021-06-24 ASSESSMENT — PAIN - FUNCTIONAL ASSESSMENT: PAIN_FUNCTIONAL_ASSESSMENT: 0-10

## 2021-06-24 ASSESSMENT — PAIN DESCRIPTION - DESCRIPTORS: DESCRIPTORS: STABBING;SHARP;DISCOMFORT

## 2021-06-24 NOTE — FLOWSHEET NOTE
Patient arrival back to IR room post coronary CTA @ 1100. Patient complains of a slight headache at this time, but otherwise feels fine. Vitals are being monitored at this time. Patient's resting heart rate is high 40s-low 50s, but when patient sits up or moves around, heart rate goes up into the 60s. Patient discharged home @ 1135.

## 2021-06-25 ENCOUNTER — TELEPHONE (OUTPATIENT)
Dept: CARDIOLOGY CLINIC | Age: 61
End: 2021-06-25

## 2021-06-25 DIAGNOSIS — R07.9 CHEST PAIN, UNSPECIFIED TYPE: Primary | ICD-10-CM

## 2021-06-25 NOTE — TELEPHONE ENCOUNTER
----- Message from Ceci Adamson MD sent at 6/25/2021  3:49 PM EDT -----  Needs sed rate, C-reactive protein,troponin and CPK. Also need last cholesterol from Dr Gladys Ansari office.

## 2021-08-05 ENCOUNTER — TELEPHONE (OUTPATIENT)
Dept: CARDIOLOGY CLINIC | Age: 61
End: 2021-08-05

## 2021-08-05 DIAGNOSIS — R07.9 CHEST PAIN, UNSPECIFIED TYPE: ICD-10-CM

## 2021-08-05 LAB — TROPONIN: NORMAL

## 2021-08-05 NOTE — TELEPHONE ENCOUNTER
----- Message from Lito Bautista MD sent at 8/5/2021  2:23 PM EDT -----  Please let patient know that all labs to check for inflammation or myocarditis were negative. Recommend office visit follow-up if he is still having chest pain.

## 2021-08-09 ENCOUNTER — OFFICE VISIT (OUTPATIENT)
Dept: CARDIOLOGY CLINIC | Age: 61
End: 2021-08-09
Payer: COMMERCIAL

## 2021-08-09 VITALS
WEIGHT: 186.2 LBS | BODY MASS INDEX: 25.22 KG/M2 | RESPIRATION RATE: 16 BRPM | DIASTOLIC BLOOD PRESSURE: 80 MMHG | HEART RATE: 56 BPM | SYSTOLIC BLOOD PRESSURE: 132 MMHG | HEIGHT: 72 IN

## 2021-08-09 DIAGNOSIS — E78.00 HYPERCHOLESTEREMIA: ICD-10-CM

## 2021-08-09 DIAGNOSIS — I25.10 CAD IN NATIVE ARTERY: ICD-10-CM

## 2021-08-09 DIAGNOSIS — R07.2 PRECORDIAL PAIN: Primary | ICD-10-CM

## 2021-08-09 PROCEDURE — 93000 ELECTROCARDIOGRAM COMPLETE: CPT | Performed by: INTERNAL MEDICINE

## 2021-08-09 PROCEDURE — 99213 OFFICE O/P EST LOW 20 MIN: CPT | Performed by: INTERNAL MEDICINE

## 2021-08-09 NOTE — PROGRESS NOTES
Patient Active Problem List   Diagnosis    Chest pain       Current Outpatient Medications   Medication Sig Dispense Refill    pantoprazole sodium (PROTONIX) 40 MG PACK packet Take 40 mg by mouth every morning (before breakfast) Daily kash tablet      Coenzyme Q10 (COQ10) 50 MG CAPS Take 50 mg by mouth daily      vitamin C (ASCORBIC ACID) 500 MG tablet Take 500 mg by mouth daily      fish oil-omega-3 fatty acids 1000 MG capsule Take 2 g by mouth daily.  rosuvastatin (CRESTOR) 10 MG tablet Take 40 mg by mouth daily       aspirin 81 MG EC tablet Take 81 mg by mouth daily.  therapeutic multivitamin-minerals (THERAGRAN-M) tablet Take 1 tablet by mouth daily. No current facility-administered medications for this visit. CC:    Patient is seen in follow up for:  1. Precordial pain    2. Hypercholesteremia    3. CAD in native artery        HPI:  Continues to have difficulties with apparent chest wall pain. Occurs at rest.  Lasts all day long. Notes he actually feels better when he exerts himself. Recent testing negative for any evidence of inflammation. Noted on CT of chest to have degenerative arthritis.     ROS:   General: No unusual weight gain, no change in exercise tolerance  Skin: No rash or itching  EENT: No vision changes or nosebleeds  Cardiovascular: No orthopnea or paroxysmal nocturnal dyspnea  Respiratory: No cough or hemoptysis  Gastrointestinal: No hematemesis or recent changes in bowel habits  Genitourinary: No hematuria, urgency or frequency  Musculoskeletal: No muscular weakness or joint swelling   Neurologic / Psychiatric: No incoordination or convulsions  Allergic / Immunologic/ Lymphatic / Endocrine: No anemia or bleeding tendency    Social History     Socioeconomic History    Marital status:      Spouse name: Not on file    Number of children: Not on file    Years of education: Not on file    Highest education level: Not on file   Occupational History    Not on file   Tobacco Use    Smoking status: Current Every Day Smoker     Packs/day: 1.00     Types: Cigarettes    Smokeless tobacco: Never Used   Substance and Sexual Activity    Alcohol use: Yes     Alcohol/week: 6.0 standard drinks     Types: 6 Cans of beer per week    Drug use: No    Sexual activity: Not on file   Other Topics Concern    Not on file   Social History Narrative    Not on file     Social Determinants of Health     Financial Resource Strain:     Difficulty of Paying Living Expenses:    Food Insecurity:     Worried About Running Out of Food in the Last Year:     [address] in the Last Year:    Transportation Needs:     Lack of Transportation (Medical):  Lack of Transportation (Non-Medical):    Physical Activity:     Days of Exercise per Week:     Minutes of Exercise per Session:    Stress:     Feeling of Stress :    Social Connections:     Frequency of Communication with Friends and Family:     Frequency of Social Gatherings with Friends and Family:     Attends [employer] Services:     Active Member of Clubs or Organizations:     Attends Club or Organization Meetings:     Marital Status:    Intimate Partner Violence:     Fear of Current or Ex-Partner:     Emotionally Abused:     Physically Abused:     Sexually Abused:        Family History   Problem Relation Age of Onset    Heart Attack Mother 36         1    Heart Attack Father 68        sudden episode       Past Medical History:   Diagnosis Date    GERD (gastroesophageal reflux disease)     Hyperlipidemia        PHYSICAL EXAM:  CONSTITUTIONAL:  Well developed, well nourished    Vitals:    21 0834   BP: 132/80   Pulse: 56   Resp: 16   Weight: 186 lb 3.2 oz (84.5 kg)   Height: 6' (1.829 m)     HEAD & FACE: Normocephalic. Symmetric. EYES: No xanthelasma. Conjunctivae not injected. EARS, NOSE, MOUTH & THROAT: Good dentition. No oral pallor or cyanosis. NECK: No JVD at 30 degrees.   No thyromegaly. RESPIRATORY: Clear to auscultation and percussion in all fields. No use of accessory muscle or intercostal retractions. CARDIOVASCULAR: Regular rate and rhythm. No lifts or thrills on palpitation. Auscultation with normal S1-S2 in intensity and splitting. No carotid bruits. Abdominal aorta not enlarged. Femoral arteries without bruits. Pedal pulses 2+. No edema. ABDOMEN: Soft without hepatic or splenic enlargement. No tenderness. MUSCULOSKELETAL: No kyphosis or scoliosis of the back. Good muscle strength and tone. No muscle atrophy. Normal gait and ability to undergo exercise stress testing. EXTREMITIES: No clubbing or cyanosis. SKIN: No Xanthomas or ulcerations. NEUROLOGIC: Oriented to time, place and person. Normal mood and affect. LYMPHATIC:  No palpable neck or supraclavicular nodes. No splenomegaly. EKG: the EKG tracing was reviewed and found to reveal: Normal sinus rhythm. No change compared to prior tracing. ASSESSMENT:                                                     ORDERS:       Diagnosis Orders   1. Precordial pain  EKG 12 Lead   2. Hypercholesteremia     3. CAD in native artery       Above assessment cardiac issues stable. Pain appears to be chest wall /noncardiac. PLAN:   See above orders. Medication reconciliation completed. Old records were reviewed and found to reveal: Negative cardiac troponin CPK sed rate and C-reactive protein. Encouraged smoking cessation. Discussed issues that would prompt earlier evaluation. Same cardiac medications. Follow-up office visit in 6 month.

## 2021-10-06 ENCOUNTER — OFFICE VISIT (OUTPATIENT)
Dept: SURGERY | Age: 61
End: 2021-10-06
Payer: COMMERCIAL

## 2021-10-06 VITALS
SYSTOLIC BLOOD PRESSURE: 130 MMHG | OXYGEN SATURATION: 100 % | TEMPERATURE: 97.2 F | HEIGHT: 72 IN | WEIGHT: 185 LBS | BODY MASS INDEX: 25.06 KG/M2 | DIASTOLIC BLOOD PRESSURE: 90 MMHG | HEART RATE: 71 BPM | RESPIRATION RATE: 16 BRPM

## 2021-10-06 DIAGNOSIS — D03.30 MELANOMA IN SITU OF FACE EXCLUDING EYELID, NOSE, LIP, AND EAR (HCC): Primary | ICD-10-CM

## 2021-10-06 PROCEDURE — 99203 OFFICE O/P NEW LOW 30 MIN: CPT | Performed by: PLASTIC SURGERY

## 2021-10-06 NOTE — PROGRESS NOTES
Department of Plastic Surgery - Adult  Attending Consult Note      CHIEF COMPLAINT:   Melanoma in situ right brow    History Obtained From:  patient    HISTORY OF PRESENT ILLNESS:                The patient is a 57 y.o. 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 The patient states that they first noticed the lesion several months ago. It has not grown in size since they first noticed the lesion. The lesion has not changed in color and has not had discharge or bleeding. The pt has had the lesion biopsied previously. The patient has not had the lesion removed previously. The patient states the lesion is not painfull. The patient denies any recent unexpected weight loss. Patient denies any palpable masses to their axilla or cervical masses . The pt denies any other associated symptoms. He does bring to my attention history of squamous cell carcinoma to his superior scalp which was removed by Mohs surgery. He also had a surgical intervention with squamous cell carcinoma excision to his left lower leg previously with his dermatologist.    Past Medical History:    Past Medical History:   Diagnosis Date    GERD (gastroesophageal reflux disease)     Hyperlipidemia      Past Surgical History:    Past Surgical History:   Procedure Laterality Date    FRACTURE SURGERY Left 11/1979    [place] nd Hip    HERNIA REPAIR Bilateral     inguinal    HIP SURGERY Left     TONSILLECTOMY      as a child     Current Medications:   No current facility-administered medications for this visit. Allergies:  Patient has no known allergies.     Social History:   Social History     Socioeconomic History    Marital status:      Spouse name: Not on file    Number of children: Not on file    Years of education: Not on file    Highest education level: Not on file   Occupational History    Not on file   Tobacco Use    Smoking status: Current Every Day Smoker     Packs/day: 1.00     Types: Cigarettes    Smokeless tobacco: Never Used   Vaping Use    Vaping Use: Never used   Substance and Sexual Activity    Alcohol use: Yes     Alcohol/week: 6.0 standard drinks     Types: 6 Cans of beer per week    Drug use: No    Sexual activity: Not on file   Other Topics Concern    Not on file   Social History Narrative    Not on file     Social Determinants of Health     Financial Resource Strain:     Difficulty of Paying Living Expenses:    Food Insecurity:     Worried About Running Out of Food in the Last Year:     [address] in the Last Year:    Transportation Needs:     Lack of Transportation (Medical):  Lack of Transportation (Non-Medical):    Physical Activity:     Days of Exercise per Week:     Minutes of Exercise per Session:    Stress:     Feeling of Stress :    Social Connections:     Frequency of Communication with Friends and Family:     Frequency of Social Gatherings with Friends and Family:     Attends Denominational Services:     Active Member of Clubs or Organizations:     Attends Club or Organization Meetings:     Marital Status:    Intimate Partner Violence:     Fear of Current or Ex-Partner:     Emotionally Abused:     Physically Abused:     Sexually Abused:      Family History:   Family History   Problem Relation Age of Onset    Heart Attack Mother 36         1    Heart Attack Father 68        sudden episode       REVIEW OF SYSTEMS:    CONSTITUTIONAL:  negative for  fevers, chills, sweats and fatigue  EYES: negative for dipolpia or acute vision loss. RESPIRATORY:  negative for  dry cough, cough with sputum, dyspnea, wheezing and chest pain  HENT:negative for pain, headache, difficulty swallowing or nose bleeds.   CARDIOVASCULAR:  negative for  chest pain, dyspnea, palpitations, syncope  GASTROINTESTINAL:  negative for nausea, vomiting, change in bowel habits, diarrhea, constipation and abdominal pain  EXTREMITIES: negative for edema  MUSCULOSKELETAL: negative for muscle weakness  SKIN: positive for lesionnegative for itching or rashes. HEME: negative for easy brusing or bleeding  BEHAVIOR/PSYCH:  negative for poor appetite, increased appetite, decreased sleep and poor concentration  All other systems negative    I have reviewed the chief complaint and history of present illness including (ROS and PFSH) and vital documentation by my staff and I agree with their documentation and have added when applicable. PHYSICAL EXAM:      VITALS:  BP (!) 130/90 (Site: Left Upper Arm, Position: Sitting, Cuff Size: Medium Adult)   Pulse 71   Temp 97.2 °F (36.2 °C) (Skin)   Resp 16   Ht 6' (1.829 m)   Wt 185 lb (83.9 kg)   SpO2 100%   BMI 25.09 kg/m²   CONSTITUTIONAL:  awake, alert, cooperative, no apparent distress, and appears stated age  EYES: PERRLA, EOMI, no signs of occular infection  LUNGS:  No increased work of breathing, good air exchange,   CARDIOVASCULAR:  Normal apical impulse, regular rate and rhythm,   EXTREMITIES: no signs of clubbing or cyanosis. MUSCULOSKELETAL: negative for flaccid muscle tone or spastic movements. NEURO: Cranial nerves II-XII grossly intact. No signs of agitated mood. SKIN: Right upper lateral brow in situ melanoma -  3mm x 3mm, previous biopsy site noted, no signs of bleeding,drainage or infection. Non tender to palpation.        DATA:    Labs: CBC:   Lab Results   Component Value Date    WBC 8.7 08/24/2019    RBC 5.06 08/24/2019    HGB 15.6 08/24/2019    HCT 46.9 08/24/2019    MCV 92.7 08/24/2019    MCH 30.8 08/24/2019    MCHC 33.3 08/24/2019    RDW 14.3 08/24/2019     08/24/2019    MPV 11.8 08/24/2019     BMP:    Lab Results   Component Value Date     06/11/2021    K 4.8 06/11/2021    K 4.3 08/24/2019     06/11/2021    CO2 30 06/11/2021    BUN 17 06/11/2021    CREATININE 1.2 06/11/2021    CALCIUM 9.8 06/11/2021    GFRAA >60 06/11/2021    LABGLOM >60 06/11/2021    GLUCOSE 91 06/11/2021       Radiology Review:  No radiology needed at this time  Pathology Review: Pathology reviewed   Pathology reviewed scanned to media melanoma in situ of right upper lateral brow  IMPRESSION/RECOMMENDATIONS:      Diagnosis  -) Right upper lateral brow melanoma in situ      -The patient was counseled on their pathology results and the need for surgical   intervention.   -We will plan to proceed and have the lesion formely excised with margins in the office.  -The patient will not require frozen sections in the operating room  -The patient will not require pre-op clearance from their PCP. -The risks, benefits and options were discussed with the pt. The risks included but not limited to pain, bleeding, infection, heavy scarring, damage to surrounding structures, fluid collections, asymmetry, and need for further procedures. All of His questions were answered to their satisfaction and He agrees to proceed with the procedure. Referral -none  Margin of resection planned - 5mm      Surgical plan: Excision of right upper lateral brow melanoma in situ with local tissue rearrangement. Photos obtained    Follow up day of procedure.    Mami Chan MD

## 2021-10-13 ENCOUNTER — PROCEDURE VISIT (OUTPATIENT)
Dept: SURGERY | Age: 61
End: 2021-10-13

## 2021-10-13 VITALS
DIASTOLIC BLOOD PRESSURE: 84 MMHG | TEMPERATURE: 97.9 F | OXYGEN SATURATION: 98 % | HEART RATE: 65 BPM | SYSTOLIC BLOOD PRESSURE: 130 MMHG

## 2021-10-13 DIAGNOSIS — D03.30 MELANOMA IN SITU OF FACE EXCLUDING EYELID, NOSE, LIP, AND EAR (HCC): Primary | ICD-10-CM

## 2021-10-13 RX ORDER — ACETAMINOPHEN AND CODEINE PHOSPHATE 300; 30 MG/1; MG/1
1 TABLET ORAL EVERY 4 HOURS PRN
Qty: 10 TABLET | Refills: 0 | Status: SHIPPED | OUTPATIENT
Start: 2021-10-13 | End: 2021-10-18

## 2021-10-13 RX ORDER — LIDOCAINE HYDROCHLORIDE AND EPINEPHRINE 10; 10 MG/ML; UG/ML
3 INJECTION, SOLUTION INFILTRATION; PERINEURAL ONCE
Status: SHIPPED | OUTPATIENT
Start: 2021-10-13

## 2021-10-13 NOTE — PROGRESS NOTES
Subjective: Follow up today for excision of right upper brow melanoma in situ. Denies fever, nausea, vomiting, leg pain or swelling. The patient voices understanding of the procedure they are having today and would like to proceed. Patient states that the mass has been painful and growing. Objective:    /84 (Site: Left Upper Arm, Position: Sitting, Cuff Size: Medium Adult)   Pulse 65   Temp 97.9 °F (36.6 °C) (Skin)   SpO2 98%       Melanoma in situ  Lesion- 3mm x 3mm  Margin- 5mm  Defect- 13mm x 13mm    Scar-35mm         Assessment:    Patient Active Problem List   Diagnosis    Chest pain       Plan:       Diagnosis  -) Right upper brow melanoma in situ  -) Pain    -The risks, benefits and options were discussed with the pt. The risks included but not limited to pain, bleeding, infection, heavy scarring, damage to surrounding structures, fluid collections, asymmetry, and need for further procedures. All of His questions were answered to their satisfaction and He agrees to proceed with the procedure.      -After consent was obtained, the area was cleansed with an alcohol swab. Local anesthesia consisting of 1% lidocaine with 1:100,000 epinepherine was injected  surrounding the area. The local was allowed to work. Using a 10-blade the  Right upper brow melanoma in situ was excised,  Intermediate  repair was performed. The wound(s) were closed with 5-0 Monocryl and 5-0 fast absorbing gut suture , hemostasis was obtained and a dressing was placed over the wound. The procedure was performed by Dr Lizzy Soria    Please schedule an appointment to be seen on Follow-up with our office in 7-14 days  Diet: regular diet  Activity: no heavy lifting for 7 days. Shower/Bathing: OK to shower over the wound site in 48 hours. No baths, hot tubs or soaking of the wound site at this time. Pt voices understanding.      Wound care:   Bacitracin will need to be placed over the wound site twice daily and covered with a gauze pad. The gauze pad can be changed as needed for saturation       Medications: As prescribed  Educated to contact physician with concerns or signs of infection (redness, increasing pain, discharge, odor, fever).     The entirety of the procedure was performed by Dr. Landen Chong with first assistance by TRACI Parker      Please note that the medical decision making for the patient as well as the subjective, objective, assessment and plan were reviewed and performed by Dr Landen Chong    The specimen was marked short superior long lateral marking suture    Shereen Lindsey MD

## 2021-10-14 ENCOUNTER — HOSPITAL ENCOUNTER (OUTPATIENT)
Age: 61
Discharge: HOME | End: 2021-10-16
Payer: COMMERCIAL

## 2021-10-18 ENCOUNTER — OFFICE VISIT (OUTPATIENT)
Dept: SURGERY | Age: 61
End: 2021-10-18

## 2021-10-18 VITALS — BODY MASS INDEX: 24.52 KG/M2 | WEIGHT: 185 LBS | HEIGHT: 73 IN

## 2021-10-18 DIAGNOSIS — D03.30 MELANOMA IN SITU OF FACE EXCLUDING EYELID, NOSE, LIP, AND EAR (HCC): Primary | ICD-10-CM

## 2021-10-18 PROCEDURE — 99024 POSTOP FOLLOW-UP VISIT: CPT | Performed by: PHYSICIAN ASSISTANT

## 2021-10-18 NOTE — PROGRESS NOTES
Subjective: Follow up today from excision of melanoma in situ right superior lateral brow with 5 mm margin and complex closure. Denies fever, nausea, vomiting, leg pain or swelling, pain is absent. The pt states that they have  kept the wound site(s) covered with bacitracin. They state that their pain is absent. Objective:    Ht 6' 1\" (1.854 m)   Wt 185 lb (83.9 kg)   BMI 24.41 kg/m²       Wound: Clean, dry, and intact, no drainage. No signs of infection, wound healing well,   Neuro- Sensation  intact to kristian incisional site with light touch . Cranial nerves II-XII grossly intact. Assessment:    Patient Active Problem List   Diagnosis    Chest pain       Labs: CBC:   Lab Results   Component Value Date    WBC 8.7 08/24/2019    RBC 5.06 08/24/2019    HGB 15.6 08/24/2019    HCT 46.9 08/24/2019    MCV 92.7 08/24/2019    MCH 30.8 08/24/2019    MCHC 33.3 08/24/2019    RDW 14.3 08/24/2019     08/24/2019    MPV 11.8 08/24/2019     BMP:    Lab Results   Component Value Date     06/11/2021    K 4.8 06/11/2021    K 4.3 08/24/2019     06/11/2021    CO2 30 06/11/2021    BUN 17 06/11/2021    CREATININE 1.2 06/11/2021    CALCIUM 9.8 06/11/2021    GFRAA >60 06/11/2021    LABGLOM >60 06/11/2021    GLUCOSE 91 06/11/2021         Pathology Report- Pending    Plan:     Educated the pt on their pathology report. Pt voices understanding      Dressing -discontinue bacitracin. Showering at this time -okay to shower at this time. I educated the patient to begin scar massage to the area in 4 weeks. I will set up a virtual visit with him to review his pathology later in the week    Pt was instructed on scar massage  Scar Care Instructions      Sunscreen Recommendations for Scars   We recommend that all patients use a sunscreen when outside but especially on new scars (that are exposed to sunlight) for a year after their procedure.  The SPF should be at least 28.  Follow the application directions on the bottle. Why is it so Important to Use Sunscreen on Scars?  A scar is new and more fragile than the surrounding skin.  If you do not use sunscreen, the scar line will react differently to the sun than the surrounding skin.  If you don't use sunscreen, the scar tissue will become darker than surrounding skin. This is a hyper-pigmented scar and will remain darker than the other skin.  After one year, the scar and surrounding skin should react equally to sun. Superficial Scar Massage   Scar massage desensitizes and reduces scar adhesions so skin glides freely.  Rub in a circular motion on and around the scar with firm, even pressure for 5 minutes four times per day    You can start scar massage once incision is completely healed and strong enough to handle the motion (usually 10 - 14 days post operatively).  You use lotion to do the scar massage to allow ease with motion over the scar and prevent friction at the area. Patient had no further questions. Patient is traveling to [place] today for an indeterminate amount of time he understands if he has any questions or concerns contact our office  F/U PRN  Call office with concerns or signs of infection.     Ana Escobedo   8:47 AM  10/18/2021

## 2021-10-21 ENCOUNTER — VIRTUAL VISIT (OUTPATIENT)
Dept: SURGERY | Age: 61
End: 2021-10-21

## 2021-10-21 DIAGNOSIS — D03.30 MELANOMA IN SITU OF FACE EXCLUDING EYELID, NOSE, LIP, AND EAR (HCC): Primary | ICD-10-CM

## 2021-10-21 PROCEDURE — 99999 PR OFFICE/OUTPT VISIT,PROCEDURE ONLY: CPT | Performed by: PHYSICIAN ASSISTANT

## 2021-10-21 NOTE — PROGRESS NOTES
Neftaly Squires is a 57 y.o. male evaluated via telephone on 10/21/2021. Consent:  He and/or health care decision maker is aware that that he may receive a bill for this telephone service, depending on his insurance coverage, and has provided verbal consent to proceed: Yes    The patient was in the [address] during the entirety of the phone conversation. Documentation:  I communicated with the patient and/or health care decision maker about the pathology results from their most recent biopsy. Details of this discussion including any medical advice provided:     Pathology [address]       [place] [address] [place] [address] 27     [address] [place]            [address]                                                   [address] [facility], [address] [facility], [address] [place] [address]                                                           [id number]               FINAL SURGICAL PATHOLOGY REPORT     NAME:           Perla Monson         Date of       10/14/2021                                            Collection:   Medical Record   [id number]              Date of       10/15/2021   Number:                                  Receipt:   Age:  59 Y        Sex:  M                Date          10/20/2021 09:16                                            Reported:   Date Of Birth:   1960   Financial        [id number]             Admitting     OLIVIER COTTO   Number:                                  Physician:   Patient          HSCASEY                    Ordering      OLIVIER COTTO   Location:                                Physician:     Accession Number:  [id number]                                             Additional Physicians:LEANNE CALLAHAN       Diagnosis:   Skin of right temple, excision:    -Negative for residual melanoma in situ (see comment).  -Organizing cutaneous scar, consistent with previous biopsy site.    -Prominent solar elastosis. Comment:    The specimen has been serially sectioned and entirely   submitted for microscopic examination.  Sections reveal no evidence of   residual melanoma in situ.  Accordingly, all inked excision margins   appear free of neoplasm. I informed the patient that his residual melanoma has been excised and his margins cleared. Patient voiced understanding appreciation I informed him continue with scar care at this time and to follow-up with his dermatologist as directed he had no further questions. I affirm this is a Patient Initiated Episode with a Patient who has not had a related appointment within my department in the past 7 days or scheduled within the next 24 hours. Patient identification was verified at the start of the visit: Yes    Total Time: minutes: <5 minutes (not billable)    The visit was conducted pursuant to the emergency declaration under the [facility], [address] [place] [address] authority and the [email or web address] General Act. Patient identification was verified, and a caregiver was present when appropriate. The patient was located in a state where the provider was credentialed to provide care.     Note: not billable if this call serves to triage the patient into an appointment for the relevant concern      TRACI Cruz

## 2022-04-04 ENCOUNTER — OFFICE VISIT (OUTPATIENT)
Dept: CARDIOLOGY CLINIC | Age: 62
End: 2022-04-04
Payer: COMMERCIAL

## 2022-04-04 VITALS
HEIGHT: 72 IN | BODY MASS INDEX: 25.42 KG/M2 | RESPIRATION RATE: 16 BRPM | DIASTOLIC BLOOD PRESSURE: 80 MMHG | WEIGHT: 187.7 LBS | HEART RATE: 59 BPM | SYSTOLIC BLOOD PRESSURE: 132 MMHG

## 2022-04-04 DIAGNOSIS — R07.2 PRECORDIAL PAIN: Primary | ICD-10-CM

## 2022-04-04 DIAGNOSIS — E78.00 HYPERCHOLESTEREMIA: ICD-10-CM

## 2022-04-04 DIAGNOSIS — I25.10 CAD IN NATIVE ARTERY: ICD-10-CM

## 2022-04-04 PROCEDURE — 93000 ELECTROCARDIOGRAM COMPLETE: CPT | Performed by: INTERNAL MEDICINE

## 2022-04-04 PROCEDURE — 99213 OFFICE O/P EST LOW 20 MIN: CPT | Performed by: INTERNAL MEDICINE

## 2022-04-04 NOTE — PROGRESS NOTES
Patient Active Problem List   Diagnosis    Chest pain       Current Outpatient Medications   Medication Sig Dispense Refill    pantoprazole sodium (PROTONIX) 40 MG PACK packet Take 40 mg by mouth every morning (before breakfast) Daily kash tablet      Coenzyme Q10 (COQ10) 50 MG CAPS Take 50 mg by mouth daily      vitamin C (ASCORBIC ACID) 500 MG tablet Take 500 mg by mouth daily      fish oil-omega-3 fatty acids 1000 MG capsule Take 2 g by mouth daily.  rosuvastatin (CRESTOR) 40 MG tablet Take 40 mg by mouth daily       aspirin 81 MG EC tablet Take 81 mg by mouth daily.  therapeutic multivitamin-minerals (THERAGRAN-M) tablet Take 1 tablet by mouth daily. Current Facility-Administered Medications   Medication Dose Route Frequency Provider Last Rate Last Admin    lidocaine-EPINEPHrine 1 %-1:038659 injection 3 mL  3 mL IntraDERmal Once TRACI Young           CC:    Patient is seen in follow up for:  1. Precordial pain    2. Hypercholesteremia    3. CAD in native artery        HPI:  Continues to have difficulties with apparent chest wall pain. Occurs at rest.  Lasts all day long. Notes he actually feels better when he exerts himself. Recent testing negative for any evidence of inflammation.   Had recent cholesterol and CT of chest.    ROS:   General: No unusual weight gain, no change in exercise tolerance  Skin: No rash or itching  EENT: No vision changes or nosebleeds  Cardiovascular: No orthopnea or paroxysmal nocturnal dyspnea  Respiratory: No cough or hemoptysis  Gastrointestinal: No hematemesis or recent changes in bowel habits  Genitourinary: No hematuria, urgency or frequency  Musculoskeletal: No muscular weakness or joint swelling   Neurologic / Psychiatric: No incoordination or convulsions  Allergic / Immunologic/ Lymphatic / Endocrine: No anemia or bleeding tendency    Social History     Socioeconomic History    Marital status:      Spouse name: Not on file    3. CAD in native artery       Above assessment cardiac issues stable. Pain appears to be chest wall /noncardiac. PLAN:   See above orders. Medication reconciliation completed. Encouraged smoking cessation. Obtain copy of lipids and CT for review. Discussed issues that would prompt earlier evaluation. Same cardiac medications. Follow-up office visit in 12 month.

## 2023-04-04 ENCOUNTER — OFFICE VISIT (OUTPATIENT)
Dept: CARDIOLOGY CLINIC | Age: 63
End: 2023-04-04
Payer: COMMERCIAL

## 2023-04-04 VITALS
HEART RATE: 55 BPM | SYSTOLIC BLOOD PRESSURE: 130 MMHG | DIASTOLIC BLOOD PRESSURE: 80 MMHG | BODY MASS INDEX: 25.68 KG/M2 | WEIGHT: 189.6 LBS | HEIGHT: 72 IN

## 2023-04-04 DIAGNOSIS — E78.00 HYPERCHOLESTEREMIA: ICD-10-CM

## 2023-04-04 DIAGNOSIS — I25.10 CAD IN NATIVE ARTERY: Primary | ICD-10-CM

## 2023-04-04 PROCEDURE — 99213 OFFICE O/P EST LOW 20 MIN: CPT | Performed by: INTERNAL MEDICINE

## 2023-04-04 PROCEDURE — 93000 ELECTROCARDIOGRAM COMPLETE: CPT | Performed by: INTERNAL MEDICINE

## 2023-04-04 NOTE — PROGRESS NOTES
Patient Active Problem List   Diagnosis    Chest pain       Current Outpatient Medications   Medication Sig Dispense Refill    pantoprazole sodium (PROTONIX) 40 MG PACK packet Take 1 packet by mouth every morning (before breakfast) Daily kash tablet      Coenzyme Q10 (COQ10) 50 MG CAPS Take 1 each by mouth daily      vitamin C (ASCORBIC ACID) 500 MG tablet Take 1 tablet by mouth daily      fish oil-omega-3 fatty acids 1000 MG capsule Take 2 capsules by mouth daily      rosuvastatin (CRESTOR) 40 MG tablet Take 1 tablet by mouth daily      aspirin 81 MG EC tablet Take 1 tablet by mouth daily      therapeutic multivitamin-minerals (THERAGRAN-M) tablet Take 1 tablet by mouth daily       Current Facility-Administered Medications   Medication Dose Route Frequency Provider Last Rate Last Admin    lidocaine-EPINEPHrine 1 %-1:034371 injection 3 mL  3 mL IntraDERmal Once TRACI Griggs           CC:    Patient is seen in follow up for:  1. CAD in native artery    2. Hypercholesteremia        HPI:  No chest pain, unusual shortness of breath, lightheadedness or dizziness. Tolerating current medications well.     ROS:   General: No unusual weight gain, no change in exercise tolerance  Skin: No rash or itching  EENT: No vision changes or nosebleeds  Cardiovascular: No orthopnea or paroxysmal nocturnal dyspnea  Respiratory: No cough or hemoptysis  Gastrointestinal: No hematemesis or recent changes in bowel habits  Genitourinary: No hematuria, urgency or frequency  Musculoskeletal: No muscular weakness or joint swelling   Neurologic / Psychiatric: No incoordination or convulsions  Allergic / Immunologic/ Lymphatic / Endocrine: No anemia or bleeding tendency    Social History     Socioeconomic History    Marital status:      Spouse name: Not on file    Number of children: Not on file    Years of education: Not on file    Highest education level: Not on file   Occupational History    Not on file   Tobacco Use

## 2024-12-31 ENCOUNTER — HOSPITAL ENCOUNTER (OUTPATIENT)
Dept: CT IMAGING | Age: 64
Discharge: HOME | End: 2024-12-31
Payer: COMMERCIAL

## 2024-12-31 DIAGNOSIS — Z12.2 SCREENING FOR MALIGNANT NEOPLASM OF RESPIRATORY ORGAN: ICD-10-CM

## 2024-12-31 DIAGNOSIS — Z87.891 PERSONAL HISTORY OF TOBACCO USE, PRESENTING HAZARDS TO HEALTH: ICD-10-CM

## 2024-12-31 DIAGNOSIS — F17.210 CIGARETTE SMOKER: ICD-10-CM

## 2024-12-31 PROCEDURE — 71271 CT THORAX LUNG CANCER SCR C-: CPT
